# Patient Record
Sex: FEMALE | Race: BLACK OR AFRICAN AMERICAN | NOT HISPANIC OR LATINO | Employment: FULL TIME | URBAN - METROPOLITAN AREA
[De-identification: names, ages, dates, MRNs, and addresses within clinical notes are randomized per-mention and may not be internally consistent; named-entity substitution may affect disease eponyms.]

---

## 2018-10-03 ENCOUNTER — OFFICE VISIT (OUTPATIENT)
Dept: OBSTETRICS AND GYNECOLOGY | Facility: CLINIC | Age: 23
End: 2018-10-03

## 2018-10-03 ENCOUNTER — TELEPHONE (OUTPATIENT)
Dept: OBSTETRICS AND GYNECOLOGY | Facility: CLINIC | Age: 23
End: 2018-10-03

## 2018-10-03 VITALS
WEIGHT: 114 LBS | HEIGHT: 64 IN | BODY MASS INDEX: 19.46 KG/M2 | DIASTOLIC BLOOD PRESSURE: 69 MMHG | HEART RATE: 74 BPM | SYSTOLIC BLOOD PRESSURE: 108 MMHG

## 2018-10-03 DIAGNOSIS — N64.59 ABNORMAL BREAST EXAM: ICD-10-CM

## 2018-10-03 DIAGNOSIS — Z01.419 WELL WOMAN EXAM WITH ROUTINE GYNECOLOGICAL EXAM: Primary | ICD-10-CM

## 2018-10-03 PROCEDURE — 99385 PREV VISIT NEW AGE 18-39: CPT | Performed by: OBSTETRICS & GYNECOLOGY

## 2018-10-03 RX ORDER — TRIAMCINOLONE ACETONIDE 1 MG/G
CREAM TOPICAL
COMMUNITY
Start: 2017-08-10 | End: 2018-10-03

## 2018-10-03 RX ORDER — DIAPER,BRIEF,INFANT-TODD,DISP
EACH MISCELLANEOUS
COMMUNITY
Start: 2018-09-07

## 2018-10-03 RX ORDER — PNV NO.95/FERROUS FUM/FOLIC AC 28MG-0.8MG
TABLET ORAL
COMMUNITY
Start: 2018-09-06 | End: 2020-06-26

## 2018-10-03 NOTE — PROGRESS NOTES
Subjective   Yoko Bellamy is a 22 y.o. female    Chief Complaint   Patient presents with   • Gynecologic Exam     patient reports she hasen't seen a gyn in a while and just wanted to get an annual.       History of Present Illness  Yoko Bellamy is a 22 y.o.  who presents for an annual examination     Pap history:  Last pap: N/A  Prior abnormal paps: no  STDs  Sexually active: yes  History of STDs: no  Has had HPV vaccine: yes  Contraception:  Condoms, was started on oral contraceptive pill by [facility] and plans to resume.  Last intercourse was one week ago, Patient's last menstrual period was 2018 (exact date).       Screening for BRCA-   Is patient's family history significant for any of the following?   no  For non-Ashkenazi Jew women:   Two first-degree relatives with breast cancer, one of whom was diagnosed at age 50 or younger   A combination of three or more first or second-degree relatives with breast cancer regardless of age at diagnosis   A combination of both breast and ovarian cancer among first and second-degree relatives   A first-degree relative with bilateral breast cancer   A combination of two or more first or second degree relatives with ovarian cancer, regardless of age at diagnosis   A first or second-degree relative with both breast and ovarian cancer at any age   History of breast cancer in a male relative   For women of Ashkenazi Jew descent:   Any first-degree relative (or two second degree relatives on the same side of the family) with breast or ovarian cancer   Recommendations from the [place] Preventive Services Task Force on who should be offered genetic testing for BRCA mutations*     History reviewed. No pertinent past medical history.  History reviewed. No pertinent surgical history.  Social History   Substance Use Topics   • Smoking status: Current Some Day Smoker   • Smokeless tobacco: Never Used   • Alcohol use Yes      Comment: wine once a  "week     Family History   Problem Relation Age of Onset   • Breast cancer Neg Hx    • Ovarian cancer Neg Hx    • Uterine cancer Neg Hx    • Colon cancer Neg Hx    • Prostate cancer Neg Hx    • Deep vein thrombosis Neg Hx      No current outpatient prescriptions on file prior to visit.     No current facility-administered medications on file prior to visit.      No Known Allergies     Review of Systems   Constitutional: Negative for chills, fever and unexpected weight change.   HENT: Negative for congestion, nosebleeds and sore throat.    Eyes: Negative for visual disturbance.   Respiratory: Negative for cough, chest tightness and shortness of breath.    Cardiovascular: Negative for chest pain and palpitations.   Gastrointestinal: Negative for abdominal pain, constipation, diarrhea, nausea and vomiting.   Endocrine: Negative for polyuria.   Genitourinary: Negative for difficulty urinating, dyspareunia, dysuria, frequency, genital sores, hematuria, menstrual problem, pelvic pain, urgency, vaginal bleeding, vaginal discharge and vaginal pain.   Musculoskeletal: Negative for arthralgias and joint swelling.   Skin: Negative for color change and rash.   Neurological: Negative for dizziness, light-headedness and headaches.   Hematological: Does not bruise/bleed easily.   Psychiatric/Behavioral: Negative for dysphoric mood. The patient is not nervous/anxious.        Objective    /69   Pulse 74   Ht 162.6 cm (64\")   Wt 51.7 kg (114 lb)   LMP 09/29/2018 (Exact Date)   Breastfeeding? No   BMI 19.57 kg/m²    Physical Exam   Constitutional: She is oriented to person, place, and time. She appears well-developed and well-nourished. No distress.   HENT:   Head: Normocephalic and atraumatic.   Neck: No tracheal deviation present. No thyromegaly present.   Cardiovascular: Normal rate, regular rhythm and normal heart sounds.  Exam reveals no gallop and no friction rub.    No murmur heard.  Pulmonary/Chest: Effort normal " and breath sounds normal. No respiratory distress. She has no wheezes. She has no rales. She exhibits no tenderness. Right breast exhibits mass (1cm at 11 o'clock, mobile). Right breast exhibits no inverted nipple, no nipple discharge, no skin change and no tenderness. Left breast exhibits no inverted nipple, no mass, no nipple discharge, no skin change and no tenderness.   Bilateral piercing of the areola   Abdominal: Soft. She exhibits no distension and no mass. There is no tenderness.   Genitourinary: Uterus normal. No labial fusion. There is no rash, lesion or injury on the right labia. There is no rash, lesion or injury on the left labia. Uterus is not deviated, not enlarged, not fixed and not tender. Cervix exhibits no motion tenderness, no discharge and no friability. Right adnexum displays no mass, no tenderness and no fullness. Left adnexum displays no mass, no tenderness and no fullness. No tenderness or bleeding in the vagina. No vaginal discharge found.   Musculoskeletal: Normal range of motion. She exhibits no edema or deformity.   Lymphadenopathy:     She has no cervical adenopathy.   Neurological: She is alert and oriented to person, place, and time.   Skin: Skin is warm and dry. No rash noted. She is not diaphoretic.   Psychiatric: She has a normal mood and affect. Her behavior is normal. Judgment and thought content normal.         Assessment/Plan   Problems Addressed this Visit     None      Visit Diagnoses     Well woman exam with routine gynecological exam    -  Primary    Abnormal breast exam        Relevant Orders    US breast right complete          Well woman counseling/screening:    Cervical cancer screening:    Reports no h/o cervical dysplasia   HPV vaccination completed   The patient is due for a pap at age 23.    Screening guidelines discussed with patient  Breast cancer screening:    Clinical breast exam recommended for age 20-39 years every 1-3 years   Mammogram recommended starting  age 40,    Breast self awareness encouraged   Small, mobile mass at 11 o'clock on right breast - patient first noticed after areola piercing last month. Will get ultrasound as it has persisted.    STD Screening   Declines as had negative screening last month  Contraception :   Desires to restart oral contraceptive pill, has used condoms for protection and had recent period. Reviewed how to start oral contraceptive pill, risks/benefits, side effects, use of back up contraception, what to do with missed dose.  Negative UPT today  Family history    does not demonstrate need for genetics referral   Healthy lifestyle counseling:   Patient was counseled on    Body mass index is 19.57 kg/m².    Exercises frequently    Smoking cessation  I advised patient to quit, and offered support.  She is in the process of quitting on her own.  Reviewed risks of marijuana and benefit of cessation  I spent 5 minutes counseling the patient on benefits of smoking cessation and risks of continuing the behavior.

## 2018-10-11 ENCOUNTER — APPOINTMENT (OUTPATIENT)
Dept: WOMENS IMAGING | Facility: HOSPITAL | Age: 23
End: 2018-10-11

## 2018-10-11 PROCEDURE — 76641 ULTRASOUND BREAST COMPLETE: CPT | Performed by: RADIOLOGY

## 2018-10-12 ENCOUNTER — TELEPHONE (OUTPATIENT)
Dept: OBSTETRICS AND GYNECOLOGY | Facility: CLINIC | Age: 23
End: 2018-10-12

## 2018-10-12 DIAGNOSIS — N64.59 ABNORMAL BREAST EXAM: Primary | ICD-10-CM

## 2018-10-12 DIAGNOSIS — R92.8 ABNORMAL FINDING ON BREAST IMAGING: Primary | ICD-10-CM

## 2018-10-12 NOTE — TELEPHONE ENCOUNTER
Sindy with [facility] called on this pt. They did an u/s on pt and seen a suspicious area on the right breast and are recommending a bx. Sindy will be faxing information as well.

## 2018-10-12 NOTE — TELEPHONE ENCOUNTER
Tried to call patient to review mammogram results and inquire if she has questions.  Per letter, she has already received results.  No answer.  Left general  for call back.    Patient had an area visualized in the right breast at 11:00 that they would like to perform an ultrasound-guided vacuum assisted biopsy on.  This procedure has been scheduled for 10/18/18 at 2 PM.  She also has two areas in the right breast that need repeat imaging in six months.  Wanted to see if patient has any questions.      10/12/18 4:05 PM   Reviewed with patient.  Order placed in separate a counter for biopsy.  Order placed this encounter for repeat ultrasound in 6 months.

## 2018-10-17 ENCOUNTER — TELEPHONE (OUTPATIENT)
Dept: OBSTETRICS AND GYNECOLOGY | Facility: CLINIC | Age: 23
End: 2018-10-17

## 2018-10-17 NOTE — TELEPHONE ENCOUNTER
[facility] called stating pt has appt tomorrow and the order was placed for the wrong side of breast. She is needing a corrected order placed into epic.Thanks!

## 2019-02-20 ENCOUNTER — TELEPHONE (OUTPATIENT)
Dept: OBSTETRICS AND GYNECOLOGY | Facility: CLINIC | Age: 24
End: 2019-02-20

## 2019-02-20 NOTE — TELEPHONE ENCOUNTER
Pt called to schedule appt with you, to f/u to breast bx.  We do not usually do a f/u for that, so I wanted to check with you as to if the pt needs appt.  She was originally scheduled at [facility] in October 2018, but she had to cancel that appt.  She is rescheduled for next Thursday, 2/28/19.  I called [facility], they have the order and they do not need any other documentation from us.  Do you need to see pt after bx?  Please advise.    Pt # [phone]

## 2019-02-20 NOTE — TELEPHONE ENCOUNTER
Patient needs the biopsy and then does not need to see me unless she is having problems or for annual exam.  Thanks!

## 2019-02-28 ENCOUNTER — APPOINTMENT (OUTPATIENT)
Dept: WOMENS IMAGING | Facility: HOSPITAL | Age: 24
End: 2019-02-28

## 2019-02-28 PROCEDURE — 19083 BX BREAST 1ST LESION US IMAG: CPT | Performed by: RADIOLOGY

## 2019-03-04 ENCOUNTER — TELEPHONE (OUTPATIENT)
Dept: OBSTETRICS AND GYNECOLOGY | Facility: CLINIC | Age: 24
End: 2019-03-04

## 2019-03-04 NOTE — TELEPHONE ENCOUNTER
----- Message from Eve Ochoa MD sent at 3/4/2019 12:47 PM EST -----  Please let patient know that her biopsy was benign. Thanks!

## 2020-04-06 ENCOUNTER — TELEPHONE (OUTPATIENT)
Dept: OBSTETRICS AND GYNECOLOGY | Facility: CLINIC | Age: 25
End: 2020-04-06

## 2020-04-06 DIAGNOSIS — N63.10 MASS OF RIGHT BREAST ON MAMMOGRAM: ICD-10-CM

## 2020-04-06 DIAGNOSIS — Z09 FOLLOW-UP EXAM, MORE THAN 1 YEAR SINCE PREVIOUS EXAM: Primary | ICD-10-CM

## 2020-04-06 NOTE — TELEPHONE ENCOUNTER
Patient called with complaints of pain during intercourse.     She stated that she is only available on fridays.  I see that you have changed your schedule and are no longer in on that day.     What do you suggest?

## 2020-04-06 NOTE — TELEPHONE ENCOUNTER
The schedule change is secondary to reduced office hours due to COVID 19.  Once restrictions have been lifted, I will likely have Friday office. We are only seeing urgent/emergent visits.  If she feels that this is urgent, she can be scheduled for an in-person visit.  If she must be seen on Fridays, you can ask another provider if they are willing to see her in person during this time.       Of note, patient has not been seen since October 2018.  Would recommend annual exam to be scheduled for late Summer/Fall

## 2020-06-26 ENCOUNTER — OFFICE VISIT (OUTPATIENT)
Dept: OBSTETRICS AND GYNECOLOGY | Facility: CLINIC | Age: 25
End: 2020-06-26

## 2020-06-26 VITALS
WEIGHT: 131 LBS | DIASTOLIC BLOOD PRESSURE: 58 MMHG | HEART RATE: 71 BPM | SYSTOLIC BLOOD PRESSURE: 105 MMHG | HEIGHT: 63 IN | BODY MASS INDEX: 23.21 KG/M2

## 2020-06-26 DIAGNOSIS — Z01.419 WELL WOMAN EXAM WITH ROUTINE GYNECOLOGICAL EXAM: Primary | ICD-10-CM

## 2020-06-26 PROCEDURE — 99395 PREV VISIT EST AGE 18-39: CPT | Performed by: OBSTETRICS & GYNECOLOGY

## 2020-06-26 NOTE — PROGRESS NOTES
Subjective   Yoko Bellamy is a 24 y.o. female    Chief Complaint   Patient presents with   • Gynecologic Exam     pt states she is here for a routine check    Yoko Bellamy is a 24 y.o.  who presents for an annual examination     Pap history:  Last pap: N/A  Prior abnormal paps: no  STDs  Sexually active: yes, with one male partner  History of STDs: no  Has had HPV vaccine: yes  Contraception:  Declines, agrees to prenatal vitamin Patient's last menstrual period was 2020 (exact date).       Screening for BRCA-   Is patient's family history significant for any of the following?   no  For non-Ashkenazi Hoahaoism women:   Two first-degree relatives with breast cancer, one of whom was diagnosed at age 50 or younger   A combination of three or more first or second-degree relatives with breast cancer regardless of age at diagnosis   A combination of both breast and ovarian cancer among first and second-degree relatives   A first-degree relative with bilateral breast cancer   A combination of two or more first or second degree relatives with ovarian cancer, regardless of age at diagnosis   A first or second-degree relative with both breast and ovarian cancer at any age   History of breast cancer in a male relative   For women of Ashkenazi Hoahaoism descent:   Any first-degree relative (or two second degree relatives on the same side of the family) with breast or ovarian cancer   Recommendations from the [place] Preventive Services Task Force on who should be offered genetic testing for BRCA mutations*     History reviewed. No pertinent past medical history.  History reviewed. No pertinent surgical history.  Social History     Tobacco Use   • Smoking status: Former Smoker   • Smokeless tobacco: Never Used   Substance Use Topics   • Alcohol use: Yes     Comment: wine once a week   • Drug use: Not Currently     Types: Marijuana     Comment: twice a week.     Family History   Problem Relation Age of Onset   •  "Breast cancer Neg Hx    • Ovarian cancer Neg Hx    • Uterine cancer Neg Hx    • Colon cancer Neg Hx    • Prostate cancer Neg Hx    • Deep vein thrombosis Neg Hx      Current Outpatient Medications on File Prior to Visit   Medication Sig Dispense Refill   • Biotin w/ Vitamins C & E (Hair/Skin/Nails) 1250-7.5-7.5 MCG-MG-UNT chewable tablet Chew.     • hydrocortisone 1 % cream      • VITAMIN D PO Take  by mouth.     • [DISCONTINUED] Prenatal Vit-Fe Fumarate-FA (PRENATAL VITAMINS) 28-0.8 MG tablet        No current facility-administered medications on file prior to visit.      No Known Allergies     Review of Systems   Constitutional: Negative for chills, fever and unexpected weight change.   HENT: Negative for congestion, nosebleeds and sore throat.    Eyes: Negative for visual disturbance.   Respiratory: Negative for cough, chest tightness and shortness of breath.    Cardiovascular: Negative for chest pain and palpitations.   Gastrointestinal: Negative for abdominal pain, constipation, diarrhea, nausea and vomiting.   Endocrine: Negative for polyuria.   Genitourinary: Negative for difficulty urinating, dyspareunia, dysuria, frequency, genital sores, hematuria, menstrual problem, pelvic pain, urgency, vaginal bleeding, vaginal discharge and vaginal pain.   Musculoskeletal: Negative for arthralgias and joint swelling.   Skin: Negative for color change and rash.   Neurological: Negative for dizziness, light-headedness and headaches.   Hematological: Does not bruise/bleed easily.   Psychiatric/Behavioral: Negative for dysphoric mood. The patient is not nervous/anxious.        Objective    /58   Pulse 71   Ht 160 cm (63\")   Wt 59.4 kg (131 lb)   LMP 06/06/2020 (Exact Date)   Breastfeeding No   BMI 23.21 kg/m²    Physical Exam   Constitutional: She is oriented to person, place, and time. She appears well-developed and well-nourished. No distress.   HENT:   Head: Normocephalic and atraumatic.   Neck: No tracheal " deviation present. No thyromegaly present.   Cardiovascular: Normal rate, regular rhythm and normal heart sounds. Exam reveals no gallop and no friction rub.   No murmur heard.  Pulmonary/Chest: Effort normal and breath sounds normal. No respiratory distress. She has no wheezes. She has no rales. She exhibits no tenderness. Right breast exhibits no inverted nipple, no mass, no nipple discharge, no skin change and no tenderness. Left breast exhibits no inverted nipple, no mass, no nipple discharge, no skin change and no tenderness.   Abdominal: Soft. She exhibits no distension and no mass. There is no tenderness.   Genitourinary: Uterus normal. No labial fusion. There is no rash, lesion or injury on the right labia. There is no rash, lesion or injury on the left labia. Uterus is not deviated, not enlarged, not fixed and not tender. Cervix exhibits no motion tenderness, no discharge and no friability. Right adnexum displays no mass, no tenderness and no fullness. Left adnexum displays no mass, no tenderness and no fullness. No tenderness or bleeding in the vagina. No vaginal discharge found.   Musculoskeletal: Normal range of motion. She exhibits no edema or deformity.   Lymphadenopathy:     She has no cervical adenopathy.   Neurological: She is alert and oriented to person, place, and time.   Skin: Skin is warm and dry. No rash noted. She is not diaphoretic.   Psychiatric: She has a normal mood and affect. Her behavior is normal. Judgment and thought content normal.         Assessment/Plan   Problems Addressed this Visit     None      Visit Diagnoses     Well woman exam with routine gynecological exam    -  Primary          Well woman counseling/screening:    Cervical cancer screening:    Reports no h/o cervical dysplasia   HPV vaccination completed   The patient is due for a pap today    Screening guidelines discussed with patient  Breast cancer screening:    Clinical breast exam recommended for age 20-39 years  every 1-3 years   Mammogram recommended starting age 40,    Breast self awareness encouraged   Small, mobile mass at 11 o'clock on right breast - had biopsy Feb 2019 and overdue for repeat US. Aware of importance of follow up  STD Screening   Desires swab  Contraception :   Declines, PNV encouraged  Family history    does not demonstrate need for genetics referral   Healthy lifestyle counseling:   Patient was counseled on    Body mass index is 23.21 kg/m².    Exercises frequently    Congratulated on smoking cessation

## 2020-06-30 LAB
C TRACH RRNA SPEC QL NAA+PROBE: NEGATIVE
CONV .: NORMAL
CYTOLOGIST CVX/VAG CYTO: NORMAL
CYTOLOGY CVX/VAG DOC CYTO: NORMAL
CYTOLOGY CVX/VAG DOC THIN PREP: NORMAL
DX ICD CODE: NORMAL
HIV 1 & 2 AB SER-IMP: NORMAL
N GONORRHOEA RRNA SPEC QL NAA+PROBE: NEGATIVE
OTHER STN SPEC: NORMAL
STAT OF ADQ CVX/VAG CYTO-IMP: NORMAL
T VAGINALIS DNA SPEC QL NAA+PROBE: NEGATIVE

## 2020-07-01 ENCOUNTER — TELEPHONE (OUTPATIENT)
Dept: OBSTETRICS AND GYNECOLOGY | Facility: CLINIC | Age: 25
End: 2020-07-01

## 2020-07-01 NOTE — TELEPHONE ENCOUNTER
----- Message from Eve Ochoa MD sent at 6/30/2020  4:50 PM EDT -----  Please inform patient of negative gonorrhea/chlamydia/trichomonas testing    07/01/20  Patient is informed of negative STI results

## 2020-10-08 ENCOUNTER — TELEPHONE (OUTPATIENT)
Dept: OBSTETRICS AND GYNECOLOGY | Facility: CLINIC | Age: 25
End: 2020-10-08

## 2020-10-08 NOTE — TELEPHONE ENCOUNTER
Followed up with patient and she still wanted to have the bilat Breast US done from her Biopsy done 2/18/2019.  She is scheduled at [facility] 10/16/20 @ 7640 and aware of the appt.  SR

## 2020-10-08 NOTE — TELEPHONE ENCOUNTER
----- Message from Eve Ochoa MD sent at 6/26/2020  4:39 PM EDT -----  Kay Aguayo -  This patient is needing to schedule her 6mo follow up US.  Can you help her schedule this? Thanks!